# Patient Record
Sex: MALE | Race: WHITE | URBAN - METROPOLITAN AREA
[De-identification: names, ages, dates, MRNs, and addresses within clinical notes are randomized per-mention and may not be internally consistent; named-entity substitution may affect disease eponyms.]

---

## 2017-05-15 ENCOUNTER — TELEPHONE (OUTPATIENT)
Dept: FAMILY MEDICINE | Facility: CLINIC | Age: 22
End: 2017-05-15

## 2017-05-15 NOTE — TELEPHONE ENCOUNTER
Reason for Call:  Other     Detailed comments: Would like to speak with his PCP to speak getting a dog to help with his emotions.     Phone Number Patient can be reached at: Home number on file [phone] (home)    Best Time: Anytime     Can we leave a detailed message on this number? YES    Call taken on 5/15/2017 at 5:25 PM by Dalia Francis

## 2017-05-15 NOTE — TELEPHONE ENCOUNTER
Will route to provider to advise. What is the process for this? Need a visit? Route to care coordination?    Sp Ernst RN

## 2017-05-16 NOTE — TELEPHONE ENCOUNTER
I am unaware of service dogs for patients who wish to deal with their emotions. I'd recommend a visit to discuss this concern and I'd suggest a good therapist to help him review this concern. I think there are some service animal / dog situations that sometimes are used for PTSD and Panic situations and occasionally therapy / service dogs but this is difficult to have covered or supported without documentation of specific mental health concerns.    Heron Nj, RAMANS, PA-C

## 2017-09-26 ENCOUNTER — TRANSFERRED RECORDS (OUTPATIENT)
Dept: HEALTH INFORMATION MANAGEMENT | Facility: CLINIC | Age: 22
End: 2017-09-26

## 2017-12-28 ENCOUNTER — TELEPHONE (OUTPATIENT)
Dept: FAMILY MEDICINE | Facility: CLINIC | Age: 22
End: 2017-12-28

## 2017-12-28 NOTE — TELEPHONE ENCOUNTER
Reason for Call:  FYI Information    Detailed comments: Mom is calling to give information about patient. She states patient is withdrawn, he recently had a break up of a long term girlfriend, he has been laid off and having a hard time finding a job, byers. She is calling to give you FYI so when you see patient you can know this about him. She has suggested to patient to come in and see you. If you have any questions please call her.    Phone Number Patient can be reached at:   Donna Finney (Mother) [phone] (H)       Best Time: anytime    Can we leave a detailed message on this number? YES    Call taken on 12/28/2017 at 1:27 PM by Pavithra Morris

## 2017-12-28 NOTE — TELEPHONE ENCOUNTER
No consent to communicate with mom. Rn called mom to see if patient was depressed, having any suicidal thoughts. Would also like to provide number for Crisis Hotline: [phone]. Unable to reach or leave a message.     Caitlyn Lara RN  [facility]

## 2018-01-03 NOTE — TELEPHONE ENCOUNTER
"Mother, Ruby returns call to SUREKHA CALZADA. She states that she has concerns about patient's behavior and thinks he may have chemical issues as well. Patient told her that she had an appointment last Friday and has a follow up appointment next week. Patient has not been in recently and no follow up is scheduled. No consent to communicate on file.    Called mother back. She is not concerned about suicidal thought. \"He has had a lot going on in the last year and has come back to live with me. Sleeps a lot, withdrawn and byers.\" She just wants this documented. \"I just hope that he would be honest with me. He has more need for cash and I don't know where his money goes.\"    I did not give mother information, but offered to call patient to see if he's like to schedule an appointment. Called and left  for patient to call back.    Sp Ernst RN            "